# Patient Record
Sex: MALE | Race: AMERICAN INDIAN OR ALASKA NATIVE | Employment: STUDENT | URBAN - METROPOLITAN AREA
[De-identification: names, ages, dates, MRNs, and addresses within clinical notes are randomized per-mention and may not be internally consistent; named-entity substitution may affect disease eponyms.]

---

## 2023-08-16 ENCOUNTER — HOSPITAL ENCOUNTER (OUTPATIENT)
Dept: ULTRASOUND IMAGING | Age: 43
Discharge: HOME | End: 2023-08-18

## 2023-08-16 DIAGNOSIS — B18.2 CHRONIC HEPATITIS C WITH HEPATIC COMA (HCC): ICD-10-CM

## 2023-08-24 ENCOUNTER — HOSPITAL ENCOUNTER (OUTPATIENT)
Dept: ULTRASOUND IMAGING | Age: 43
Discharge: HOME | End: 2023-08-26
Payer: COMMERCIAL

## 2023-08-24 PROCEDURE — 76981 USE PARENCHYMA: CPT

## 2024-09-29 ENCOUNTER — APPOINTMENT (OUTPATIENT)
Dept: GENERAL RADIOLOGY | Age: 44
End: 2024-09-29
Payer: COMMERCIAL

## 2024-09-29 ENCOUNTER — HOSPITAL ENCOUNTER (OUTPATIENT)
Age: 44
Setting detail: OBSERVATION
Discharge: OTHER INSTITUTION | End: 2024-10-01
Attending: STUDENT IN AN ORGANIZED HEALTH CARE EDUCATION/TRAINING PROGRAM | Admitting: STUDENT IN AN ORGANIZED HEALTH CARE EDUCATION/TRAINING PROGRAM
Payer: COMMERCIAL

## 2024-09-29 DIAGNOSIS — M62.82 NON-TRAUMATIC RHABDOMYOLYSIS: Primary | ICD-10-CM

## 2024-09-29 DIAGNOSIS — R74.8 ELEVATED LIVER ENZYMES: ICD-10-CM

## 2024-09-29 DIAGNOSIS — R42 LIGHTHEADED: ICD-10-CM

## 2024-09-29 LAB
ABSOLUTE BANDS: 0.05 K/UL
ALBUMIN SERPL-MCNC: 3.5 G/DL (ref 3.4–5)
ALBUMIN/GLOB SERPL: 1.5 {RATIO} (ref 1.1–2.2)
ALP SERPL-CCNC: 112 U/L (ref 40–129)
ALT SERPL-CCNC: 51 U/L (ref 10–40)
ANION GAP SERPL CALCULATED.3IONS-SCNC: 11 MMOL/L (ref 9–17)
AST SERPL-CCNC: 120 U/L (ref 15–37)
BANDS: 1 %
BASOPHILS # BLD: 0 K/UL
BASOPHILS NFR BLD: 0 % (ref 0–1)
BILIRUB SERPL-MCNC: <0.2 MG/DL (ref 0–1)
BUN SERPL-MCNC: 10 MG/DL (ref 7–20)
CALCIUM SERPL-MCNC: 8.8 MG/DL (ref 8.3–10.6)
CHLORIDE SERPL-SCNC: 104 MMOL/L (ref 99–110)
CK SERPL-CCNC: 2166 U/L (ref 26–192)
CO2 SERPL-SCNC: 25 MMOL/L (ref 21–32)
CREAT SERPL-MCNC: 0.8 MG/DL (ref 0.9–1.3)
EOSINOPHIL # BLD: 0.16 K/UL
EOSINOPHILS RELATIVE PERCENT: 3 % (ref 0–3)
ERYTHROCYTE [DISTWIDTH] IN BLOOD BY AUTOMATED COUNT: 14.1 % (ref 11.7–14.9)
ETHANOLAMINE SERPL-MCNC: <10 MG/DL (ref 0–0.08)
GFR, ESTIMATED: >90 ML/MIN/1.73M2
GLUCOSE SERPL-MCNC: 101 MG/DL (ref 74–99)
HCT VFR BLD AUTO: 39.9 % (ref 42–52)
HGB BLD-MCNC: 12.8 G/DL (ref 13.5–18)
LIPASE SERPL-CCNC: 30 U/L (ref 13–60)
LYMPHOCYTES NFR BLD: 1.4 K/UL
LYMPHOCYTES RELATIVE PERCENT: 27 % (ref 24–44)
MAGNESIUM SERPL-MCNC: 1.8 MG/DL (ref 1.8–2.4)
MCH RBC QN AUTO: 31.2 PG (ref 27–31)
MCHC RBC AUTO-ENTMCNC: 32.1 G/DL (ref 32–36)
MCV RBC AUTO: 97.3 FL (ref 78–100)
MONOCYTES NFR BLD: 0.47 K/UL
MONOCYTES NFR BLD: 9 % (ref 0–4)
NEUTROPHILS NFR BLD: 60 % (ref 36–66)
NEUTS SEG NFR BLD: 3.12 K/UL
PLATELET # BLD AUTO: 157 K/UL (ref 140–440)
PLATELET ESTIMATE: NORMAL
PMV BLD AUTO: 11.4 FL (ref 7.5–11.1)
POTASSIUM SERPL-SCNC: 4.4 MMOL/L (ref 3.5–5.1)
PROT SERPL-MCNC: 5.9 G/DL (ref 6.4–8.2)
RBC # BLD AUTO: 4.1 M/UL (ref 4.6–6.2)
RBC # BLD: NORMAL 10*6/UL
SODIUM SERPL-SCNC: 140 MMOL/L (ref 136–145)
TROPONIN I SERPL HS-MCNC: <6 NG/L (ref 0–22)
TROPONIN I SERPL HS-MCNC: <6 NG/L (ref 0–22)
TSH SERPL DL<=0.05 MIU/L-ACNC: 3.22 UIU/ML (ref 0.27–4.2)
WBC # BLD: NORMAL 10*3/UL
WBC OTHER # BLD: 5.2 K/UL (ref 4–10.5)

## 2024-09-29 PROCEDURE — 84443 ASSAY THYROID STIM HORMONE: CPT

## 2024-09-29 PROCEDURE — 2580000003 HC RX 258: Performed by: INTERNAL MEDICINE

## 2024-09-29 PROCEDURE — G0480 DRUG TEST DEF 1-7 CLASSES: HCPCS

## 2024-09-29 PROCEDURE — 71045 X-RAY EXAM CHEST 1 VIEW: CPT

## 2024-09-29 PROCEDURE — 96361 HYDRATE IV INFUSION ADD-ON: CPT

## 2024-09-29 PROCEDURE — 36415 COLL VENOUS BLD VENIPUNCTURE: CPT

## 2024-09-29 PROCEDURE — 1200000000 HC SEMI PRIVATE

## 2024-09-29 PROCEDURE — 82550 ASSAY OF CK (CPK): CPT

## 2024-09-29 PROCEDURE — G0378 HOSPITAL OBSERVATION PER HR: HCPCS

## 2024-09-29 PROCEDURE — 93005 ELECTROCARDIOGRAM TRACING: CPT | Performed by: STUDENT IN AN ORGANIZED HEALTH CARE EDUCATION/TRAINING PROGRAM

## 2024-09-29 PROCEDURE — 2580000003 HC RX 258: Performed by: STUDENT IN AN ORGANIZED HEALTH CARE EDUCATION/TRAINING PROGRAM

## 2024-09-29 PROCEDURE — 85025 COMPLETE CBC W/AUTO DIFF WBC: CPT

## 2024-09-29 PROCEDURE — 84484 ASSAY OF TROPONIN QUANT: CPT

## 2024-09-29 PROCEDURE — 80053 COMPREHEN METABOLIC PANEL: CPT

## 2024-09-29 PROCEDURE — 83735 ASSAY OF MAGNESIUM: CPT

## 2024-09-29 PROCEDURE — 99285 EMERGENCY DEPT VISIT HI MDM: CPT

## 2024-09-29 PROCEDURE — 83690 ASSAY OF LIPASE: CPT

## 2024-09-29 RX ORDER — ENOXAPARIN SODIUM 100 MG/ML
40 INJECTION SUBCUTANEOUS DAILY
Status: DISCONTINUED | OUTPATIENT
Start: 2024-09-30 | End: 2024-10-01 | Stop reason: HOSPADM

## 2024-09-29 RX ORDER — ACETAMINOPHEN 650 MG/1
650 SUPPOSITORY RECTAL EVERY 6 HOURS PRN
Status: DISCONTINUED | OUTPATIENT
Start: 2024-09-29 | End: 2024-10-01 | Stop reason: HOSPADM

## 2024-09-29 RX ORDER — SODIUM CHLORIDE 0.9 % (FLUSH) 0.9 %
5-40 SYRINGE (ML) INJECTION PRN
Status: DISCONTINUED | OUTPATIENT
Start: 2024-09-29 | End: 2024-10-01 | Stop reason: HOSPADM

## 2024-09-29 RX ORDER — ONDANSETRON 4 MG/1
4 TABLET, ORALLY DISINTEGRATING ORAL EVERY 8 HOURS PRN
Status: DISCONTINUED | OUTPATIENT
Start: 2024-09-29 | End: 2024-10-01 | Stop reason: HOSPADM

## 2024-09-29 RX ORDER — POLYETHYLENE GLYCOL 3350 17 G/17G
17 POWDER, FOR SOLUTION ORAL DAILY PRN
Status: DISCONTINUED | OUTPATIENT
Start: 2024-09-29 | End: 2024-10-01 | Stop reason: HOSPADM

## 2024-09-29 RX ORDER — 0.9 % SODIUM CHLORIDE 0.9 %
1000 INTRAVENOUS SOLUTION INTRAVENOUS ONCE
Status: COMPLETED | OUTPATIENT
Start: 2024-09-29 | End: 2024-09-29

## 2024-09-29 RX ORDER — CLONIDINE HYDROCHLORIDE 0.3 MG/1
0.3 TABLET ORAL DAILY
Status: ON HOLD | COMMUNITY
End: 2024-10-01 | Stop reason: HOSPADM

## 2024-09-29 RX ORDER — SODIUM CHLORIDE 9 MG/ML
INJECTION, SOLUTION INTRAVENOUS PRN
Status: DISCONTINUED | OUTPATIENT
Start: 2024-09-29 | End: 2024-10-01 | Stop reason: HOSPADM

## 2024-09-29 RX ORDER — POTASSIUM CHLORIDE 1500 MG/1
40 TABLET, EXTENDED RELEASE ORAL PRN
Status: DISCONTINUED | OUTPATIENT
Start: 2024-09-29 | End: 2024-10-01 | Stop reason: HOSPADM

## 2024-09-29 RX ORDER — ACETAMINOPHEN 325 MG/1
650 TABLET ORAL EVERY 6 HOURS PRN
Status: DISCONTINUED | OUTPATIENT
Start: 2024-09-29 | End: 2024-10-01 | Stop reason: HOSPADM

## 2024-09-29 RX ORDER — ONDANSETRON 2 MG/ML
4 INJECTION INTRAMUSCULAR; INTRAVENOUS EVERY 6 HOURS PRN
Status: DISCONTINUED | OUTPATIENT
Start: 2024-09-29 | End: 2024-10-01 | Stop reason: HOSPADM

## 2024-09-29 RX ORDER — SODIUM CHLORIDE 9 MG/ML
INJECTION, SOLUTION INTRAVENOUS CONTINUOUS
Status: DISCONTINUED | OUTPATIENT
Start: 2024-09-29 | End: 2024-10-01

## 2024-09-29 RX ORDER — GABAPENTIN 300 MG/1
300 CAPSULE ORAL DAILY
COMMUNITY

## 2024-09-29 RX ORDER — MAGNESIUM SULFATE IN WATER 40 MG/ML
2000 INJECTION, SOLUTION INTRAVENOUS PRN
Status: DISCONTINUED | OUTPATIENT
Start: 2024-09-29 | End: 2024-10-01 | Stop reason: HOSPADM

## 2024-09-29 RX ORDER — QUETIAPINE FUMARATE 300 MG/1
300 TABLET, FILM COATED ORAL DAILY
COMMUNITY

## 2024-09-29 RX ORDER — POTASSIUM CHLORIDE 7.45 MG/ML
10 INJECTION INTRAVENOUS PRN
Status: DISCONTINUED | OUTPATIENT
Start: 2024-09-29 | End: 2024-10-01 | Stop reason: HOSPADM

## 2024-09-29 RX ORDER — SODIUM CHLORIDE 9 MG/ML
INJECTION, SOLUTION INTRAVENOUS CONTINUOUS
Status: DISCONTINUED | OUTPATIENT
Start: 2024-09-29 | End: 2024-09-29

## 2024-09-29 RX ORDER — SODIUM CHLORIDE 0.9 % (FLUSH) 0.9 %
5-40 SYRINGE (ML) INJECTION EVERY 12 HOURS SCHEDULED
Status: DISCONTINUED | OUTPATIENT
Start: 2024-09-29 | End: 2024-10-01 | Stop reason: HOSPADM

## 2024-09-29 RX ADMIN — SODIUM CHLORIDE, PRESERVATIVE FREE 10 ML: 5 INJECTION INTRAVENOUS at 22:36

## 2024-09-29 RX ADMIN — SODIUM CHLORIDE: 9 INJECTION, SOLUTION INTRAVENOUS at 22:36

## 2024-09-29 RX ADMIN — SODIUM CHLORIDE: 9 INJECTION, SOLUTION INTRAVENOUS at 19:38

## 2024-09-29 RX ADMIN — SODIUM CHLORIDE 1000 ML: 9 INJECTION, SOLUTION INTRAVENOUS at 17:44

## 2024-09-29 ASSESSMENT — PAIN DESCRIPTION - ONSET: ONSET: ON-GOING

## 2024-09-29 ASSESSMENT — PAIN DESCRIPTION - PAIN TYPE: TYPE: ACUTE PAIN

## 2024-09-29 ASSESSMENT — PAIN DESCRIPTION - DESCRIPTORS: DESCRIPTORS: PRESSURE

## 2024-09-29 ASSESSMENT — PAIN SCALES - GENERAL
PAINLEVEL_OUTOF10: 8
PAINLEVEL_OUTOF10: 8

## 2024-09-29 ASSESSMENT — PAIN DESCRIPTION - LOCATION
LOCATION: GENERALIZED;ABDOMEN
LOCATION: GENERALIZED

## 2024-09-29 ASSESSMENT — PAIN - FUNCTIONAL ASSESSMENT: PAIN_FUNCTIONAL_ASSESSMENT: ACTIVITIES ARE NOT PREVENTED

## 2024-09-29 ASSESSMENT — PAIN DESCRIPTION - ORIENTATION: ORIENTATION: MID;RIGHT;LEFT

## 2024-09-29 ASSESSMENT — PAIN DESCRIPTION - FREQUENCY: FREQUENCY: CONTINUOUS

## 2024-09-29 NOTE — ED NOTES
screen  Additional Comments: na   If any further questions, please call Sending RN at [phone]    Electronically signed by: Electronically signed by Lan Win RN on 9/29/2024 at 7:42 PM

## 2024-09-29 NOTE — ED PROVIDER NOTES
mmol/L    Potassium 4.4 3.5 - 5.1 mmol/L    Chloride 104 99 - 110 mmol/L    CO2 25 21 - 32 mmol/L    Anion Gap 11 9 - 17 mmol/L    Glucose 101 (H) 74 - 99 mg/dL    BUN 10 7 - 20 mg/dL    Creatinine 0.8 (L) 0.9 - 1.3 mg/dL    Est, Glom Filt Rate >90 >60 mL/min/1.73m2    Calcium 8.8 8.3 - 10.6 mg/dL    Total Protein 5.9 (L) 6.4 - 8.2 g/dL    Albumin 3.5 3.4 - 5.0 g/dL    Albumin/Globulin Ratio 1.5 1.1 - 2.2    Total Bilirubin <0.2 0.0 - 1.0 mg/dL    Alkaline Phosphatase 112 40 - 129 U/L    ALT 51 (H) 10 - 40 U/L     (H) 15 - 37 U/L   ETOH   Result Value Ref Range    Ethanol Lvl <10 <10 mg/dL   Lipase   Result Value Ref Range    Lipase 30 13 - 60 U/L   Magnesium   Result Value Ref Range    Magnesium 1.8 1.8 - 2.4 mg/dL   Troponin Now and Q1Hr   Result Value Ref Range    Troponin, High Sensitivity <6 0 - 22 ng/L   TSH w/reflex to FT4   Result Value Ref Range    TSH 3.22 0.27 - 4.20 uIU/mL   CK   Result Value Ref Range    Total CK 2,166 (H) 26 - 192 U/L   EKG 12 Lead   Result Value Ref Range    Ventricular Rate 91 BPM    Atrial Rate 91 BPM    P-R Interval 162 ms    QRS Duration 80 ms    Q-T Interval 354 ms    QTc Calculation (Bazett) 435 ms    P Axis 65 degrees    R Axis 73 degrees    T Axis 25 degrees    Diagnosis       Normal sinus rhythm  Normal ECG  No previous ECGs available        Radiographs (if obtained):  [] The following radiograph was interpreted by myself in the absence of a radiologist:   [] Radiologist's Report Reviewed:  XR CHEST PORTABLE   Final Result   No acute findings in the chest         Electronically signed by Mark Dave MD        Radiologist's Report Reviewed:  No results found.    Procedures:  None        Chart review shows recent radiographs:  No results found.      Discussion with Other Professionals : Admitting Team      Social Determinants:  Drug and alcohol use, currently in a sober living facility    Records Reviewed : Other as noted in HPI/MD    Chronic conditions affecting

## 2024-09-30 LAB
ALBUMIN SERPL-MCNC: 3.2 G/DL (ref 3.4–5)
ALBUMIN/GLOB SERPL: 1.2 {RATIO} (ref 1.1–2.2)
ALP SERPL-CCNC: 112 U/L (ref 40–129)
ALT SERPL-CCNC: 47 U/L (ref 10–40)
AMPHET UR QL SCN: NEGATIVE
ANION GAP SERPL CALCULATED.3IONS-SCNC: 8 MMOL/L (ref 9–17)
AST SERPL-CCNC: 114 U/L (ref 15–37)
BARBITURATES UR QL SCN: NEGATIVE
BASOPHILS # BLD: 0.02 K/UL
BASOPHILS NFR BLD: 0 % (ref 0–1)
BENZODIAZ UR QL: POSITIVE
BILIRUB SERPL-MCNC: <0.2 MG/DL (ref 0–1)
BILIRUB UR QL STRIP: NEGATIVE
BUN SERPL-MCNC: 8 MG/DL (ref 7–20)
CALCIUM SERPL-MCNC: 8.4 MG/DL (ref 8.3–10.6)
CANNABINOIDS UR QL SCN: POSITIVE
CHLORIDE SERPL-SCNC: 106 MMOL/L (ref 99–110)
CK SERPL-CCNC: 1609 U/L (ref 26–192)
CLARITY UR: CLEAR
CO2 SERPL-SCNC: 24 MMOL/L (ref 21–32)
COCAINE UR QL SCN: NEGATIVE
COLOR UR: YELLOW
COMMENT: NORMAL
CREAT SERPL-MCNC: 0.7 MG/DL (ref 0.9–1.3)
EKG ATRIAL RATE: 91 BPM
EKG DIAGNOSIS: NORMAL
EKG P AXIS: 65 DEGREES
EKG P-R INTERVAL: 162 MS
EKG Q-T INTERVAL: 354 MS
EKG QRS DURATION: 80 MS
EKG QTC CALCULATION (BAZETT): 435 MS
EKG R AXIS: 73 DEGREES
EKG T AXIS: 25 DEGREES
EKG VENTRICULAR RATE: 91 BPM
EOSINOPHIL # BLD: 0.2 K/UL
EOSINOPHILS RELATIVE PERCENT: 4 % (ref 0–3)
ERYTHROCYTE [DISTWIDTH] IN BLOOD BY AUTOMATED COUNT: 14.2 % (ref 11.7–14.9)
FENTANYL UR QL: NEGATIVE
GFR, ESTIMATED: >90 ML/MIN/1.73M2
GLUCOSE SERPL-MCNC: 120 MG/DL (ref 74–99)
GLUCOSE UR STRIP-MCNC: NEGATIVE MG/DL
HCT VFR BLD AUTO: 37.8 % (ref 42–52)
HGB BLD-MCNC: 12 G/DL (ref 13.5–18)
HGB UR QL STRIP.AUTO: NEGATIVE
IMM GRANULOCYTES # BLD AUTO: 0.03 K/UL
IMM GRANULOCYTES NFR BLD: 1 %
KETONES UR STRIP-MCNC: NEGATIVE MG/DL
LEUKOCYTE ESTERASE UR QL STRIP: NEGATIVE
LYMPHOCYTES NFR BLD: 1.09 K/UL
LYMPHOCYTES RELATIVE PERCENT: 22 % (ref 24–44)
MCH RBC QN AUTO: 31 PG (ref 27–31)
MCHC RBC AUTO-ENTMCNC: 31.7 G/DL (ref 32–36)
MCV RBC AUTO: 97.7 FL (ref 78–100)
MONOCYTES NFR BLD: 0.89 K/UL
MONOCYTES NFR BLD: 18 % (ref 0–4)
NEUTROPHILS NFR BLD: 56 % (ref 36–66)
NEUTS SEG NFR BLD: 2.81 K/UL
NITRITE UR QL STRIP: NEGATIVE
OPIATES UR QL SCN: NEGATIVE
OXYCODONE UR QL SCN: NEGATIVE
PH UR STRIP: 6 [PH] (ref 5–8)
PLATELET, FLUORESCENCE: 144 K/UL (ref 140–440)
PMV BLD AUTO: 10.1 FL (ref 7.5–11.1)
POTASSIUM SERPL-SCNC: 4.2 MMOL/L (ref 3.5–5.1)
PROT SERPL-MCNC: 6 G/DL (ref 6.4–8.2)
PROT UR STRIP-MCNC: NEGATIVE MG/DL
RBC # BLD AUTO: 3.87 M/UL (ref 4.6–6.2)
SODIUM SERPL-SCNC: 138 MMOL/L (ref 136–145)
SP GR UR STRIP: 1.02 (ref 1–1.03)
TEST INFORMATION: ABNORMAL
UROBILINOGEN UR STRIP-ACNC: 0.2 EU/DL (ref 0–1)
WBC OTHER # BLD: 5 K/UL (ref 4–10.5)

## 2024-09-30 PROCEDURE — 96361 HYDRATE IV INFUSION ADD-ON: CPT

## 2024-09-30 PROCEDURE — G0378 HOSPITAL OBSERVATION PER HR: HCPCS

## 2024-09-30 PROCEDURE — 94761 N-INVAS EAR/PLS OXIMETRY MLT: CPT

## 2024-09-30 PROCEDURE — 36415 COLL VENOUS BLD VENIPUNCTURE: CPT

## 2024-09-30 PROCEDURE — 85025 COMPLETE CBC W/AUTO DIFF WBC: CPT

## 2024-09-30 PROCEDURE — 80307 DRUG TEST PRSMV CHEM ANLYZR: CPT

## 2024-09-30 PROCEDURE — 1200000000 HC SEMI PRIVATE

## 2024-09-30 PROCEDURE — 96372 THER/PROPH/DIAG INJ SC/IM: CPT

## 2024-09-30 PROCEDURE — 80074 ACUTE HEPATITIS PANEL: CPT

## 2024-09-30 PROCEDURE — 93010 ELECTROCARDIOGRAM REPORT: CPT | Performed by: INTERNAL MEDICINE

## 2024-09-30 PROCEDURE — 81003 URINALYSIS AUTO W/O SCOPE: CPT

## 2024-09-30 PROCEDURE — 82550 ASSAY OF CK (CPK): CPT

## 2024-09-30 PROCEDURE — 6360000002 HC RX W HCPCS: Performed by: INTERNAL MEDICINE

## 2024-09-30 PROCEDURE — 80053 COMPREHEN METABOLIC PANEL: CPT

## 2024-09-30 PROCEDURE — 2580000003 HC RX 258: Performed by: INTERNAL MEDICINE

## 2024-09-30 PROCEDURE — 6370000000 HC RX 637 (ALT 250 FOR IP): Performed by: STUDENT IN AN ORGANIZED HEALTH CARE EDUCATION/TRAINING PROGRAM

## 2024-09-30 RX ORDER — METRONIDAZOLE 7.5 MG/G
GEL VAGINAL 2 TIMES DAILY
Status: DISCONTINUED | OUTPATIENT
Start: 2024-09-30 | End: 2024-09-30 | Stop reason: SDUPTHER

## 2024-09-30 RX ORDER — METOPROLOL SUCCINATE 25 MG/1
12.5 TABLET, EXTENDED RELEASE ORAL DAILY
Status: DISCONTINUED | OUTPATIENT
Start: 2024-09-30 | End: 2024-10-01 | Stop reason: HOSPADM

## 2024-09-30 RX ADMIN — SODIUM CHLORIDE: 9 INJECTION, SOLUTION INTRAVENOUS at 15:10

## 2024-09-30 RX ADMIN — SODIUM CHLORIDE: 9 INJECTION, SOLUTION INTRAVENOUS at 08:34

## 2024-09-30 RX ADMIN — SODIUM CHLORIDE: 9 INJECTION, SOLUTION INTRAVENOUS at 15:09

## 2024-09-30 RX ADMIN — METRONIDAZOLE: 7.5 CREAM TOPICAL at 21:18

## 2024-09-30 RX ADMIN — ENOXAPARIN SODIUM 40 MG: 100 INJECTION SUBCUTANEOUS at 09:25

## 2024-09-30 RX ADMIN — METOPROLOL SUCCINATE 12.5 MG: 25 TABLET, FILM COATED, EXTENDED RELEASE ORAL at 21:15

## 2024-09-30 RX ADMIN — SODIUM CHLORIDE, PRESERVATIVE FREE 10 ML: 5 INJECTION INTRAVENOUS at 09:25

## 2024-09-30 ASSESSMENT — PAIN SCALES - WONG BAKER: WONGBAKER_NUMERICALRESPONSE: NO HURT

## 2024-09-30 ASSESSMENT — PAIN DESCRIPTION - ORIENTATION: ORIENTATION: MID;RIGHT;LEFT

## 2024-09-30 ASSESSMENT — PAIN DESCRIPTION - DESCRIPTORS: DESCRIPTORS: PRESSURE

## 2024-09-30 ASSESSMENT — PAIN SCALES - GENERAL
PAINLEVEL_OUTOF10: 0
PAINLEVEL_OUTOF10: 6

## 2024-09-30 ASSESSMENT — PAIN DESCRIPTION - LOCATION: LOCATION: GENERALIZED

## 2024-09-30 ASSESSMENT — PAIN DESCRIPTION - FREQUENCY: FREQUENCY: CONTINUOUS

## 2024-09-30 ASSESSMENT — PAIN DESCRIPTION - ONSET: ONSET: ON-GOING

## 2024-09-30 ASSESSMENT — PAIN - FUNCTIONAL ASSESSMENT: PAIN_FUNCTIONAL_ASSESSMENT: ACTIVITIES ARE NOT PREVENTED

## 2024-09-30 NOTE — DISCHARGE INSTRUCTIONS
PCP List  Call to schedule follow up hospital follow up appointment:  [facility] at [facility] [phone]  [address] [facility] Room 30     [facility] [phone]  [address] [facility], [address]     [facility] [phone]  [address] [facility]     2. Call for new patient Primary Care Physician appointment:  Physician Finder [phone]     Dr. Kirkland and Dr. Carr [phone]  [address] [place], [place]     Dr. Karimi and Dora Ya [phone]  [address] [facility], [address] [place], [place]     Dr. Velasco and Tasha Cowan -[phone]  [address] [place], [place]     Addis Nobles [phone]  [address] [place], [place]     Dalila Walton CNP - [facility] [phone] [address] [place], [place] *Private Pay ONLY - Membership Program*     Dr. Mortensen, Rogelio Grossman PA and Jose Hughes -[phone]  [address] [facility], [address] [facility] (*Active Waiting List*) [phone]  [address] [place], [place]     Dr. Nascimento [phone]  [address] [place], [place]     Rj Pete [phone]  [address] [place], [place]  Dr. Balbuena [phone]  [address] [facility], [address] [place], [place]     Dr. Kirkland, Dr. Hiram Reza, Katharina Zee PA, Bobo Negron NP, and Emma Hutson -[phone]  [address] [facility], [address] [place], [place]     Mariluz Sanchez PA and Tyra Morris -[phone]  [address] [place] [address], [address] [place], [place]     Dr. Janette Bowman, Dr. Christine Williamson, Lisa Montero PA, and Rojelio Mondragon -[phone]  [address] [place], [place]     Neto Barlow PA, [phone]  [address] [place], [place]     Dr. Flores and Bisi Santiago -[phone] [address] [facility] [address] [place], [place]

## 2024-09-30 NOTE — PROGRESS NOTES
4 Eyes Skin Assessment     NAME:  Obed Prasad  YOB: 1980  MEDICAL RECORD NUMBER:  [id number]    The patient is being assessed for  Admission    I agree that at least one RN has performed a thorough Head to Toe Skin Assessment on the patient. ALL assessment sites listed below have been assessed.      Areas assessed by both nurses:    Head, Face, Ears, Shoulders, Back, Chest, Arms, Elbows, Hands, Sacrum. Buttock, Coccyx, Ischium, Legs. Feet and Heels, and Under Medical Devices         Does the Patient have a Wound? No noted wound(s)       Juan Alberto Prevention initiated by RN: No  Wound Care Orders initiated by RN: No    Pressure Injury (Stage 3,4, Unstageable, DTI, NWPT, and Complex wounds) if present, place Wound referral order by RN under : No    New Ostomies, if present place, Ostomy referral order under : No     Nurse 1 eSignature: Electronically signed by Eva Andrade RN on 9/29/24 at 11:42 PM EDT    **SHARE this note so that the co-signing nurse can place an eSignature**    Nurse 2 eSignature: Electronically signed by Mari Verduzco RN on 9/30/24 at 2:37 AM EDT

## 2024-09-30 NOTE — CARE COORDINATION
Cm in to see pt at bedside. Informed pt that [facility] is unable to take pt to [facility] to get clothes, and unable to pick pt up from [facility]. Pt asked this Cm to call pt's insurance and ask them if they would take pt from [facility] to [facility]. Cm attempted to call [facility], no answer. Cm left  requesting return call. Cm to follow.

## 2024-09-30 NOTE — PROGRESS NOTES
Comprehensive Nutrition Assessment    Type and Reason for Visit:  Initial, Positive Nutrition Screen (wt loss)    Nutrition Recommendations/Plan:   Continue Regular Diet  Begin standard high niki oral nutrition supplement tid, between meals     Malnutrition Assessment:  Malnutrition Status:  At risk for malnutrition  (09/30/24 1516)    Context:  Social/Environmental Circumstances       Nutrition Assessment:    Pt admitted with rhabdomyolysis. H/O bipolar disorder, polysubstance abuse, chronic hepatitis C, recently admitted to [facility] for withdrawal/detox and was discharged to [facility]. He reports wt loss PTA 2/2 not eating right. States usual wt 175 lb one year ago. 6% wt loss noted over the past 15 mos per Epic history, is not clinically significant. Reports his po intake has improved recently, consuming greater than half of meals. Willing to take oral supplement, prefers strawberry. Will continue to follow as moderate nutrition risk.    Nutrition Related Findings:    Glu 124   Wound Type: None       Current Nutrition Intake & Therapies:    Average Meal Intake: 51-75%  Average Supplements Intake: None Ordered  ADULT DIET; Regular    Anthropometric Measures:  Height: 170 cm (5' 6.93\")  Ideal Body Weight (IBW): 148 lbs (67 kg)    Admission Body Weight: 64.1 kg (141 lb 5 oz)  Current Body Weight: 64.1 kg (141 lb 5 oz),   IBW.    Current BMI (kg/m2): 22.2  Usual Body Weight: 68 kg (149 lb 14.6 oz) (6/19/23)  % Weight Change (Calculated): -5.7                    BMI Categories: Normal Weight (BMI 18.5-24.9)    Estimated Daily Nutrient Needs:  Energy Requirements Based On: Kcal/kg  Weight Used for Energy Requirements: Current  Energy (kcal/day): 9149-2264 (25-30 kcal/kg)  Weight Used for Protein Requirements: Current  Protein (g/day): 64-77 (1-1.2 g/kg)  Method Used for Fluid Requirements: 1 ml/kcal  Fluid (ml/day): 0795-0631    Nutrition Diagnosis:   Predicted inadequate energy intake related to psychological

## 2024-09-30 NOTE — FLOWSHEET NOTE
09/29/24 2227   Vital Signs   Orthostatic B/P and Pulse? Yes   Blood Pressure Lying 93/64   Pulse Lying 86 PER MINUTE   Blood Pressure Sitting 99/72   Pulse Sitting 92 PER MINUTE   Blood Pressure Standing 97/69   Pulse Standing 97 PER MINUTE

## 2024-09-30 NOTE — CARE COORDINATION
Chart reviewed. Cm met with pt at bedside, introduced self and role of case management. Pt is from [facility]. Pt stated that he has only been there for a few days, because every time they take his vitals, his BP is high and they send him to a hospital. Pt was living with a friend, but the friend is going to long term and pt is unsure if he can return there. Pt stated that he was going to [facility] for detox, but had detoxed in the hospitals pt has been to, and just [facility]. Pt stated that he has been in contact with [facility], a rehab facility, in [place], and is planning on going there after pt's detox. Pt stated that he now just wants to go straight to [facility] and does not want to go back to [facility]. Pt stated that all his belongings are at [facility]. Pt stated that he has no one that he knows that would be able to bring pt's belongings to the hospital so he can go to [facility].     Cm called Mahesh with [facility], explained pt's situation. Mahesh stated that he is unable to get pt, as [place] is too far from [place]. Mahesh asked this Cm to give pt Mahesh's number so that he may call Mahesh and ask him any questions.     Pt plan is to go back to [facility]. Pt stated that he does not have a PCP, PCP list placed on discharge instructions.    09/30/24 1254   Service Assessment   Patient Orientation Alert and Oriented   Cognition Alert   History Provided By Patient;Medical Record   Primary Caregiver Self   Accompanied By/Relationship N/A   Support Systems Family Members;[employer]   Patient's Healthcare Decision Maker is: Legal Next of Kin   PCP Verified by CM No  (Pt does not have a PCP)   Prior Functional Level Independent in ADLs/IADLs   Current Functional Level Independent in ADLs/IADLs   Can patient return to prior living arrangement Yes   Ability to make needs known: Good   Family able to assist with home care needs: Yes   Would you like for
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
## 2024-09-30 NOTE — PROGRESS NOTES
Patient mad and getting agitated threatening to pull IV off as he has been hungry since he came in and has been asking for something to eat since 5:30pm. Pt came to unit at 9:45pm and provided with a TV dinner and apple juice as was available on floor. States that he didn't have to wait for 5hrs for a 2minute meal, this nurse tried to explain that it's 2 different units but pt adamant that it's all our faults and that I should own up to that mistake. Pt left in bed with meal. Will continue to monitor.
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
## 2024-10-01 VITALS
BODY MASS INDEX: 22.18 KG/M2 | TEMPERATURE: 98.3 F | RESPIRATION RATE: 16 BRPM | WEIGHT: 141.31 LBS | DIASTOLIC BLOOD PRESSURE: 102 MMHG | OXYGEN SATURATION: 98 % | SYSTOLIC BLOOD PRESSURE: 163 MMHG | HEIGHT: 67 IN | HEART RATE: 88 BPM

## 2024-10-01 PROBLEM — R42 DIZZINESS: Status: ACTIVE | Noted: 2024-10-01

## 2024-10-01 LAB
ABSOLUTE BANDS: 0.18 K/UL
ALBUMIN SERPL-MCNC: 3.8 G/DL (ref 3.4–5)
ALBUMIN/GLOB SERPL: 1.4 {RATIO} (ref 1.1–2.2)
ALP SERPL-CCNC: 126 U/L (ref 40–129)
ALT SERPL-CCNC: 64 U/L (ref 10–40)
ANION GAP SERPL CALCULATED.3IONS-SCNC: 10 MMOL/L (ref 9–17)
AST SERPL-CCNC: 99 U/L (ref 15–37)
BANDS: 3 %
BASOPHILS # BLD: 0 K/UL
BASOPHILS NFR BLD: 0 % (ref 0–1)
BILIRUB SERPL-MCNC: <0.2 MG/DL (ref 0–1)
BUN SERPL-MCNC: 6 MG/DL (ref 7–20)
CALCIUM SERPL-MCNC: 9.2 MG/DL (ref 8.3–10.6)
CHLORIDE SERPL-SCNC: 102 MMOL/L (ref 99–110)
CK SERPL-CCNC: 1127 U/L (ref 26–192)
CO2 SERPL-SCNC: 26 MMOL/L (ref 21–32)
CREAT SERPL-MCNC: 0.7 MG/DL (ref 0.9–1.3)
EOSINOPHIL # BLD: 0.3 K/UL
EOSINOPHILS RELATIVE PERCENT: 5 % (ref 0–3)
ERYTHROCYTE [DISTWIDTH] IN BLOOD BY AUTOMATED COUNT: 13.3 % (ref 11.7–14.9)
GFR, ESTIMATED: >90 ML/MIN/1.73M2
GLUCOSE SERPL-MCNC: 91 MG/DL (ref 74–99)
HAV IGM SERPL QL IA: NONREACTIVE
HBV CORE IGM SERPL QL IA: NONREACTIVE
HBV SURFACE AG SERPL QL IA: NONREACTIVE
HCT VFR BLD AUTO: 41.1 % (ref 42–52)
HCV AB SERPL QL IA: NORMAL
HGB BLD-MCNC: 13.7 G/DL (ref 13.5–18)
LYMPHOCYTES NFR BLD: 1.36 K/UL
LYMPHOCYTES RELATIVE PERCENT: 23 % (ref 24–44)
MCH RBC QN AUTO: 30.9 PG (ref 27–31)
MCHC RBC AUTO-ENTMCNC: 33.3 G/DL (ref 32–36)
MCV RBC AUTO: 92.6 FL (ref 78–100)
METAMYELOCYTES ABSOLUTE COUNT: 0.06 K/UL
METAMYELOCYTES: 1 %
MONOCYTES NFR BLD: 1.42 K/UL
MONOCYTES NFR BLD: 24 % (ref 0–4)
NEUTROPHILS NFR BLD: 44 % (ref 36–66)
NEUTS SEG NFR BLD: 2.6 K/UL
PLATELET # BLD AUTO: 181 K/UL (ref 140–440)
PLATELET ESTIMATE: NORMAL
PMV BLD AUTO: 9.6 FL (ref 7.5–11.1)
POTASSIUM SERPL-SCNC: 4.8 MMOL/L (ref 3.5–5.1)
PROT SERPL-MCNC: 6.5 G/DL (ref 6.4–8.2)
RBC # BLD AUTO: 4.44 M/UL (ref 4.6–6.2)
RBC # BLD: ABNORMAL 10*6/UL
SODIUM SERPL-SCNC: 139 MMOL/L (ref 136–145)
WBC # BLD: NORMAL 10*3/UL
WBC OTHER # BLD: 5.9 K/UL (ref 4–10.5)

## 2024-10-01 PROCEDURE — 85025 COMPLETE CBC W/AUTO DIFF WBC: CPT

## 2024-10-01 PROCEDURE — 6370000000 HC RX 637 (ALT 250 FOR IP): Performed by: STUDENT IN AN ORGANIZED HEALTH CARE EDUCATION/TRAINING PROGRAM

## 2024-10-01 PROCEDURE — 82550 ASSAY OF CK (CPK): CPT

## 2024-10-01 PROCEDURE — 96372 THER/PROPH/DIAG INJ SC/IM: CPT

## 2024-10-01 PROCEDURE — 2580000003 HC RX 258: Performed by: INTERNAL MEDICINE

## 2024-10-01 PROCEDURE — 36415 COLL VENOUS BLD VENIPUNCTURE: CPT

## 2024-10-01 PROCEDURE — 96361 HYDRATE IV INFUSION ADD-ON: CPT

## 2024-10-01 PROCEDURE — G0378 HOSPITAL OBSERVATION PER HR: HCPCS

## 2024-10-01 PROCEDURE — 6360000002 HC RX W HCPCS: Performed by: INTERNAL MEDICINE

## 2024-10-01 PROCEDURE — 80053 COMPREHEN METABOLIC PANEL: CPT

## 2024-10-01 RX ORDER — METOPROLOL SUCCINATE 25 MG/1
12.5 TABLET, EXTENDED RELEASE ORAL DAILY
Qty: 30 TABLET | Refills: 3 | Status: SHIPPED | OUTPATIENT
Start: 2024-10-01

## 2024-10-01 RX ORDER — CLONIDINE HYDROCHLORIDE 0.1 MG/1
0.1 TABLET ORAL DAILY
Status: DISCONTINUED | OUTPATIENT
Start: 2024-10-01 | End: 2024-10-01 | Stop reason: HOSPADM

## 2024-10-01 RX ORDER — LOSARTAN POTASSIUM 25 MG/1
25 TABLET ORAL DAILY
Qty: 30 TABLET | Refills: 3 | Status: SHIPPED | OUTPATIENT
Start: 2024-10-02

## 2024-10-01 RX ORDER — LOSARTAN POTASSIUM 25 MG/1
25 TABLET ORAL DAILY
Status: DISCONTINUED | OUTPATIENT
Start: 2024-10-01 | End: 2024-10-01 | Stop reason: HOSPADM

## 2024-10-01 RX ORDER — CLONIDINE HYDROCHLORIDE 0.1 MG/1
0.1 TABLET ORAL DAILY
Qty: 60 TABLET | Refills: 3 | Status: SHIPPED | OUTPATIENT
Start: 2024-10-01

## 2024-10-01 RX ADMIN — SODIUM CHLORIDE, PRESERVATIVE FREE 10 ML: 5 INJECTION INTRAVENOUS at 10:56

## 2024-10-01 RX ADMIN — CLONIDINE HYDROCHLORIDE 0.1 MG: 0.1 TABLET ORAL at 13:52

## 2024-10-01 RX ADMIN — METRONIDAZOLE: 7.5 CREAM TOPICAL at 10:55

## 2024-10-01 RX ADMIN — METOPROLOL SUCCINATE 12.5 MG: 25 TABLET, FILM COATED, EXTENDED RELEASE ORAL at 10:54

## 2024-10-01 RX ADMIN — SODIUM CHLORIDE 150 ML/HR: 9 INJECTION, SOLUTION INTRAVENOUS at 05:05

## 2024-10-01 RX ADMIN — ENOXAPARIN SODIUM 40 MG: 100 INJECTION SUBCUTANEOUS at 10:55

## 2024-10-01 RX ADMIN — LOSARTAN POTASSIUM 25 MG: 25 TABLET, FILM COATED ORAL at 12:48

## 2024-10-01 ASSESSMENT — PAIN SCALES - GENERAL
PAINLEVEL_OUTOF10: 7
PAINLEVEL_OUTOF10: 0

## 2024-10-01 ASSESSMENT — PAIN SCALES - WONG BAKER: WONGBAKER_NUMERICALRESPONSE: NO HURT

## 2024-10-01 ASSESSMENT — PAIN DESCRIPTION - DESCRIPTORS: DESCRIPTORS: ACHING

## 2024-10-01 ASSESSMENT — PAIN DESCRIPTION - LOCATION: LOCATION: GENERALIZED

## 2024-10-01 NOTE — CARE COORDINATION
Pt is on discharge today.  RN informed CM that pt was told he can appeal his discharge.  Pt has Medicaid [employer].  Pt does not have the right to appeal his discharge.  The only pt's who can appeal a discharge are ones that have Medicare or a managed Medicare.  CHAPINCITO Caruso went to pt's room and informed him that he is not able to appeal his discharge and the doctor has placed a discharge order.

## 2024-10-01 NOTE — DISCHARGE SUMMARY
The bony thorax is intact.      No acute findings in the chest Electronically signed by Mark Dave MD      CBC:   Recent Labs     09/29/24  1739 09/30/24  0128 10/01/24  0313   WBC 5.2 5.0 5.9   HGB 12.8* 12.0* 13.7     --  181     BMP:    Recent Labs     09/29/24  1739 09/30/24  0128 10/01/24  0313    138 139   K 4.4 4.2 4.8    106 102   CO2 25 24 26   BUN 10 8 6*   CREATININE 0.8* 0.7* 0.7*   GLUCOSE 101* 120* 91     Hepatic:   Recent Labs     09/29/24  1739 09/30/24  0128 10/01/24  0313   * 114* 99*   ALT 51* 47* 64*   BILITOT <0.2 <0.2 <0.2   ALKPHOS 112 112 126     Lipids: No results found for: \"CHOL\", \"HDL\", \"TRIG\"  Hemoglobin A1C: No results found for: \"LABA1C\"  TSH:   Lab Results   Component Value Date/Time    TSH 3.22 09/29/2024 05:39 PM     Troponin: No results found for: \"TROPONINT\"  Lactic Acid: No results for input(s): \"LACTA\" in the last 72 hours.  BNP: No results for input(s): \"PROBNP\" in the last 72 hours.  UA:  Lab Results   Component Value Date/Time    NITRU NEGATIVE 09/30/2024 12:30 AM    COLORU Yellow 09/30/2024 12:30 AM    PHUR 6.0 09/30/2024 12:30 AM    LEUKOCYTESUR NEGATIVE 09/30/2024 12:30 AM    UROBILINOGEN 0.2 09/30/2024 12:30 AM    BILIRUBINUR NEGATIVE 09/30/2024 12:30 AM    GLUCOSEU NEGATIVE 09/30/2024 12:30 AM    KETUA NEGATIVE 09/30/2024 12:30 AM     Urine Cultures: No results found for: \"LABURIN\"  Blood Cultures: No results found for: \"BC\"  No results found for: \"BLOODCULT2\"  Organism: No results found for: \"ORG\"    Time Spent Discharging patient 55 minutes    Electronically signed by Cydney Marshall MD on 10/1/2024 at 1:32 PM

## 2024-10-01 NOTE — CARE COORDINATION
Cm called [facility], spoke with admissions. Cm explained situation to admissions. Cm explained that pt needs to go to long-term rehab in [place], and need transportation there. Admissions stated that they do not set up transport for pt and that pt will need to find his own transport. Admissions advised this Cm that pt has already been discharged from their service because he has been out of the facility for 24 hours. Cm confirmed that pt's belongings are still there. Cm will call admissions line and have pt readmitted prior to discharge.     Cm called [employer] [facility], spoke with [place]. Unable to answer this Cm's questions.     [facility], had pt readmitted and they are awaiting his arrival. Cm informed by Gay BECKHAM that this Cm is able to send pt in a taxi to gather his belongings at [facility], and then send him to [facility]. Cm attempted to call Mahesh with [facility], no answer. Cm left  requesting return call.     Pt's RN at this 's desk, stated that pt's BP is high, and may not discharge. Cm to follow.

## 2024-10-01 NOTE — CARE COORDINATION
Cm in to see pt, informed pt that this Cm could get pt a taxi to [facility] and then to [facility]. Pt denied this, as he stated he has too many belongings to fit into a taxi. Pt stated that he will just go to [facility] and then call [facility] to have them bring a large van to take pt to [facility]. Pt denied any further needs from this Cm. Pt stated that he has the  at [facility] phone number, stated that pt will call him for a ride if he needs one. No other questions or needs stated at this time. Cm to follow.     Discharging RN: if pt is unable to get ride, please set up [facility] to [facility] at discharge.

## 2024-10-01 NOTE — PROGRESS NOTES
Outpatient Pharmacy Progress Note for Meds-to-Beds    Total number of Prescriptions Filled: 2    Additional Documentation:  Patient picked-up the medication(s) in the OP Pharmacy      Thank you for letting us serve your patients.  [address] [address]    Phone: [phone]    Fax: [phone]

## 2024-10-06 ENCOUNTER — APPOINTMENT (OUTPATIENT)
Dept: GENERAL RADIOLOGY | Age: 44
End: 2024-10-06
Attending: STUDENT IN AN ORGANIZED HEALTH CARE EDUCATION/TRAINING PROGRAM
Payer: COMMERCIAL

## 2024-10-06 ENCOUNTER — HOSPITAL ENCOUNTER (EMERGENCY)
Age: 44
Discharge: HOME | End: 2024-10-06
Attending: EMERGENCY MEDICINE
Payer: COMMERCIAL

## 2024-10-06 VITALS
TEMPERATURE: 98.1 F | OXYGEN SATURATION: 98 % | DIASTOLIC BLOOD PRESSURE: 93 MMHG | RESPIRATION RATE: 16 BRPM | WEIGHT: 141 LBS | HEIGHT: 66 IN | HEART RATE: 112 BPM | SYSTOLIC BLOOD PRESSURE: 147 MMHG | BODY MASS INDEX: 22.66 KG/M2

## 2024-10-06 DIAGNOSIS — R07.9 CHEST PAIN, UNSPECIFIED TYPE: ICD-10-CM

## 2024-10-06 DIAGNOSIS — R03.0 ELEVATED BLOOD PRESSURE READING: ICD-10-CM

## 2024-10-06 DIAGNOSIS — F19.10 SUBSTANCE ABUSE (HCC): Primary | ICD-10-CM

## 2024-10-06 LAB
ALBUMIN SERPL-MCNC: 4.2 G/DL (ref 3.4–5)
ALBUMIN/GLOB SERPL: 1.6 {RATIO} (ref 1.1–2.2)
ALP SERPL-CCNC: 130 U/L (ref 40–129)
ALT SERPL-CCNC: 36 U/L (ref 10–40)
ANION GAP SERPL CALCULATED.3IONS-SCNC: 12 MMOL/L (ref 9–17)
AST SERPL-CCNC: 31 U/L (ref 15–37)
BASOPHILS # BLD: 0.09 K/UL
BASOPHILS NFR BLD: 1 % (ref 0–1)
BILIRUB SERPL-MCNC: <0.2 MG/DL (ref 0–1)
BUN SERPL-MCNC: 13 MG/DL (ref 7–20)
CALCIUM SERPL-MCNC: 9.2 MG/DL (ref 8.3–10.6)
CHLORIDE SERPL-SCNC: 103 MMOL/L (ref 99–110)
CO2 SERPL-SCNC: 26 MMOL/L (ref 21–32)
CREAT SERPL-MCNC: 0.8 MG/DL (ref 0.9–1.3)
EOSINOPHIL # BLD: 0.45 K/UL
EOSINOPHILS RELATIVE PERCENT: 5 % (ref 0–3)
ERYTHROCYTE [DISTWIDTH] IN BLOOD BY AUTOMATED COUNT: 13.8 % (ref 11.7–14.9)
GFR, ESTIMATED: >90 ML/MIN/1.73M2
GLUCOSE SERPL-MCNC: 69 MG/DL (ref 74–99)
HCT VFR BLD AUTO: 40.6 % (ref 42–52)
HGB BLD-MCNC: 13.3 G/DL (ref 13.5–18)
IMM GRANULOCYTES # BLD AUTO: 0.15 K/UL
IMM GRANULOCYTES NFR BLD: 2 %
LYMPHOCYTES NFR BLD: 2.41 K/UL
LYMPHOCYTES RELATIVE PERCENT: 26 % (ref 24–44)
MCH RBC QN AUTO: 31.1 PG (ref 27–31)
MCHC RBC AUTO-ENTMCNC: 32.8 G/DL (ref 32–36)
MCV RBC AUTO: 95.1 FL (ref 78–100)
MONOCYTES NFR BLD: 0.69 K/UL
MONOCYTES NFR BLD: 8 % (ref 0–4)
NEUTROPHILS NFR BLD: 59 % (ref 36–66)
NEUTS SEG NFR BLD: 5.35 K/UL
PLATELET # BLD AUTO: 328 K/UL (ref 140–440)
PMV BLD AUTO: 9.1 FL (ref 7.5–11.1)
POTASSIUM SERPL-SCNC: 4.2 MMOL/L (ref 3.5–5.1)
PROT SERPL-MCNC: 6.8 G/DL (ref 6.4–8.2)
RBC # BLD AUTO: 4.27 M/UL (ref 4.6–6.2)
SODIUM SERPL-SCNC: 141 MMOL/L (ref 136–145)
TROPONIN I SERPL HS-MCNC: 13 NG/L (ref 0–22)
WBC OTHER # BLD: 9.1 K/UL (ref 4–10.5)

## 2024-10-06 PROCEDURE — 36415 COLL VENOUS BLD VENIPUNCTURE: CPT

## 2024-10-06 PROCEDURE — 71046 X-RAY EXAM CHEST 2 VIEWS: CPT

## 2024-10-06 PROCEDURE — 85025 COMPLETE CBC W/AUTO DIFF WBC: CPT

## 2024-10-06 PROCEDURE — 93005 ELECTROCARDIOGRAM TRACING: CPT | Performed by: EMERGENCY MEDICINE

## 2024-10-06 PROCEDURE — 80053 COMPREHEN METABOLIC PANEL: CPT

## 2024-10-06 PROCEDURE — 2580000003 HC RX 258: Performed by: STUDENT IN AN ORGANIZED HEALTH CARE EDUCATION/TRAINING PROGRAM

## 2024-10-06 PROCEDURE — 84484 ASSAY OF TROPONIN QUANT: CPT

## 2024-10-06 PROCEDURE — 99285 EMERGENCY DEPT VISIT HI MDM: CPT

## 2024-10-06 RX ORDER — 0.9 % SODIUM CHLORIDE 0.9 %
1000 INTRAVENOUS SOLUTION INTRAVENOUS ONCE
Status: COMPLETED | OUTPATIENT
Start: 2024-10-06 | End: 2024-10-06

## 2024-10-06 RX ADMIN — SODIUM CHLORIDE 1000 ML: 9 INJECTION, SOLUTION INTRAVENOUS at 23:04

## 2024-10-06 ASSESSMENT — PAIN - FUNCTIONAL ASSESSMENT
PAIN_FUNCTIONAL_ASSESSMENT: 0-10
PAIN_FUNCTIONAL_ASSESSMENT: 0-10

## 2024-10-06 ASSESSMENT — PAIN DESCRIPTION - LOCATION: LOCATION: CHEST

## 2024-10-06 ASSESSMENT — PAIN SCALES - GENERAL
PAINLEVEL_OUTOF10: 0
PAINLEVEL_OUTOF10: 6

## 2024-10-07 LAB
EKG ATRIAL RATE: 108 BPM
EKG DIAGNOSIS: NORMAL
EKG P AXIS: 56 DEGREES
EKG P-R INTERVAL: 154 MS
EKG Q-T INTERVAL: 334 MS
EKG QRS DURATION: 78 MS
EKG QTC CALCULATION (BAZETT): 447 MS
EKG R AXIS: 51 DEGREES
EKG T AXIS: 41 DEGREES
EKG VENTRICULAR RATE: 108 BPM

## 2024-10-07 PROCEDURE — 93010 ELECTROCARDIOGRAM REPORT: CPT | Performed by: INTERNAL MEDICINE

## 2024-10-07 NOTE — ED PROVIDER NOTES
reviewed. No pertinent past medical history.  FAMILY HISTORY  History reviewed. No pertinent family history.  SOCIAL HISTORY  Social History     Socioeconomic History    Marital status: Single     Spouse name: None    Number of children: None    Years of education: None    Highest education level: None   Tobacco Use    Smoking status: Some Days     Current packs/day: 0.50     Types: Cigarettes    Smokeless tobacco: Never   Vaping Use    Vaping status: Some Days    Substances: Nicotine, THC, Flavoring    Devices: Disposable, Refillable tank    Passive vaping exposure: Yes   Substance and Sexual Activity    Alcohol use: Yes    Drug use: Yes     Social Determinants of Health     Food Insecurity: No Food Insecurity (9/29/2024)    Hunger Vital Sign     Worried About Running Out of Food in the Last Year: Never true     Ran Out of Food in the Last Year: Never true   Transportation Needs: No Transportation Needs (9/29/2024)    PRAPARE - Transportation     Lack of Transportation (Medical): No     Lack of Transportation (Non-Medical): No   Housing Stability: Low Risk  (9/29/2024)    Housing Stability Vital Sign     Unable to Pay for Housing in the Last Year: No     Number of Times Moved in the Last Year: 0     Homeless in the Last Year: No       SURGICAL HISTORY  History reviewed. No pertinent surgical history.  CURRENT MEDICATIONS  Discharge Medication List as of 10/6/2024 11:34 PM        CONTINUE these medications which have NOT CHANGED    Details   metoprolol succinate (TOPROL XL) 25 MG extended release tablet Take 0.5 tablets by mouth daily, Disp-30 tablet, R-3Print      metroNIDAZOLE (METROCREAM) 0.75 % cream Apply topically 2 times daily., Disp-60 g, R-2, Print      losartan (COZAAR) 25 MG tablet Take 1 tablet by mouth daily, Disp-30 tablet, R-3Normal      cloNIDine (CATAPRES) 0.1 MG tablet Take 1 tablet by mouth daily, Disp-60 tablet, R-3Normal      gabapentin (NEURONTIN) 300 MG capsule Take 1 capsule by mouth